# Patient Record
(demographics unavailable — no encounter records)

---

## 2025-03-13 NOTE — HISTORY OF PRESENT ILLNESS
[FreeTextEntry1] : moles [de-identified] : 40F with no personal hx of skin cancer here for mole check

## 2025-03-13 NOTE — PHYSICAL EXAM
[Alert] : alert [Oriented x 3] : ~L oriented x 3 [Well Nourished] : well nourished [Conjunctiva Non-injected] : conjunctiva non-injected [No Visual Lymphadenopathy] : no visual  lymphadenopathy [No Clubbing] : no clubbing [No Edema] : no edema [No Bromhidrosis] : no bromhidrosis [No Chromhidrosis] : no chromhidrosis [FreeTextEntry3] : The patient is well-appearing, in no acute distress, alert and oriented x 3. Mood and affect are normal. A complete cutaneous examination of the scalp, face, neck, chest, abdomen, back, bilateral arms, bilateral legs, buttocks, digits, nails, eyelids, conjunctiva and lips reveals the following significant findings: -Tan to dark brown macules and papules on the trunk and extremities with no concerning dermoscopic features

## 2025-04-11 NOTE — HISTORY OF PRESENT ILLNESS
[FreeTextEntry1] : Ms. Partida is a 40 year old woman who presents for a follow-up for a high risk for breast cancer. She is asymptomatic without any breast complaints.  Her family history is significant for breast cancer in the mother at 64 whose genetic testing was negative.

## 2025-04-11 NOTE — CONSULT LETTER
[Dear  ___] : Dear  [unfilled], [Courtesy Letter:] : I had the pleasure of seeing your patient, [unfilled], in my office today. [Please see my note below.] : Please see my note below. [Consult Closing:] : Thank you very much for allowing me to participate in the care of this patient.  If you have any questions, please do not hesitate to contact me. [Sincerely,] : Sincerely, [DrYuliet  ___] : Dr. PORTILLO [FreeTextEntry3] : Elizabeth Gregory MD FACS

## 2025-04-11 NOTE — ASSESSMENT
[FreeTextEntry1] : Ms. Partida is a 40 year old woman at high risk for breast cancer with a left breast nodule on ultrasound that has been stable for 6 months. Her breast exam today is without suspicious findings. A mammogram and US are ordered for September for both continued follow-up of the left breast nodule and annual screening. The MRI will be planned for 6 months after that. I would like to see her back for a follow-up in 1 year. She understands and is comfortable with the plan. She is encouraged to call if any questions or concerns arise.

## 2025-04-11 NOTE — DATA REVIEWED
[FreeTextEntry1] : I have independently reviewed the reports and the images.   B/l mammogram and complete US 9/12/24 - extremely dense - 0.6 cm nodule in L breast 3:00 N1 - BIRADS 0; L US  L limited US 10/11/24 - 0.5 cm nodule in L breast 3:00 N1 -  BIRADS 3; 6 mo L US   Breast MRI 10/11/24 - BIRADS 1   L limited US 3/28/25 - 0.5 cm nodule in L breast 3:00 N1, stable -  BIRADS 3; 6 mo US

## 2025-04-11 NOTE — PAST MEDICAL HISTORY
[Menarche Age ____] : age at menarche was [unfilled] [Regular Cycle Intervals] : have been regular [Total Preg ___] : G[unfilled] [Live Births ___] : P[unfilled]  [Age At Live Birth ___] : Age at live birth: [unfilled] [FreeTextEntry8] : 2 yr